# Patient Record
Sex: FEMALE | Race: OTHER | NOT HISPANIC OR LATINO
[De-identification: names, ages, dates, MRNs, and addresses within clinical notes are randomized per-mention and may not be internally consistent; named-entity substitution may affect disease eponyms.]

---

## 2023-07-14 PROBLEM — Z00.00 ENCOUNTER FOR PREVENTIVE HEALTH EXAMINATION: Status: ACTIVE | Noted: 2023-07-14

## 2023-07-18 ENCOUNTER — NON-APPOINTMENT (OUTPATIENT)
Age: 73
End: 2023-07-18

## 2023-07-18 ENCOUNTER — APPOINTMENT (OUTPATIENT)
Dept: ORTHOPEDIC SURGERY | Facility: CLINIC | Age: 73
End: 2023-07-18
Payer: MEDICARE

## 2023-07-18 VITALS
DIASTOLIC BLOOD PRESSURE: 84 MMHG | BODY MASS INDEX: 28.05 KG/M2 | SYSTOLIC BLOOD PRESSURE: 131 MMHG | HEART RATE: 73 BPM | HEIGHT: 57 IN | WEIGHT: 130 LBS

## 2023-07-18 DIAGNOSIS — M19.012 PRIMARY OSTEOARTHRITIS, RIGHT SHOULDER: ICD-10-CM

## 2023-07-18 DIAGNOSIS — M19.011 PRIMARY OSTEOARTHRITIS, RIGHT SHOULDER: ICD-10-CM

## 2023-07-18 PROCEDURE — 99204 OFFICE O/P NEW MOD 45 MIN: CPT

## 2023-07-18 PROCEDURE — 73030 X-RAY EXAM OF SHOULDER: CPT | Mod: 50

## 2023-07-18 NOTE — HISTORY OF PRESENT ILLNESS
[Worsening] : worsening [___ yrs] : [unfilled] year(s) ago [de-identified] : LYDIA HOWELL is a 72 year female being seen for initial visit bilateral shoulder pain R>L. She states she has been feeling pain for 3 years. She notes she has felt she has lost strength and range of motion in her shoulders. She notes she has difficulty holding objects in her hands due to the pain. She has used Motrin for her pain to this point and is no longer effective.

## 2023-07-18 NOTE — REASON FOR VISIT
[Initial Visit] : an initial visit for [Family Member] : family member [FreeTextEntry2] : bilat shoulder pain.

## 2023-07-18 NOTE — PHYSICAL EXAM
[de-identified] : General: Well appearing, no acute distress \par Neurologic: A&Ox3, No focal deficits \par Head: NCAT without abrasions, lacerations, or ecchymosis to head, face, or scalp \par Eyes: No scleral icterus, no gross abnormalities \par Respiratory: Equal chest wall expansion bilaterally, no accessory muscle use \par Lymphatic: No lymphadenopathy palpated \par Skin: Warm and dry \par Psychiatric: Normal mood and affect\par \par Right Shoulder:\par · Inspection/Palpation: no tenderness, swelling or deformities\par · Range of Motion: full and painless in all planes, no crepitus; FF 0-90; ER at side 10; IR to T7\par · Strength: forward elevation in scapular plane [5]/5, internal rotation [5]/5, external rotation [5]/5,\par adduction [5]/5 and abduction [5]/5\par · Stability: load and shift test negative, apprehension test negative, relocation test negative, sulcus\par sign negative, Echo test negative, Jerk test negative\par · Tests: Ba negative, Neer's test's test negative, drop arm test negative, bear hug test negative,\par [place] sign negative, cross arm adduction negative, lift off sign negative, Hornblower's sign negative,\par speeds test negative, Yergason's test negative, bicipital groove tenderness negative, Renee's Active\par Compression test negative, whipple test negative, bicep's load II test negative\par \par Left Shoulder\par -Inspection/Palpation: no tenderness, swelling or deformities. No evidence of muscle atrophy. No\par swelling, erythema, or ecchymosis. Nontender to palpation throughout the Left shoulder including the\par anterior aspect of the bicipital groove, AC joint, trapezius, and posterior shoulder\par -Range of Motion: full and painless in all planes, no crepitus; active FF 0-110; ER at\par side 15; IR to T7\par -Strength: forward elevation in scapular plane [5]/5, internal rotation [5]/5, external rotation [5]/5,\par adduction [5]/5 and abduction [5]/5\par -Stability: load and shift test negative, apprehension test negative, relocation test negative, sulcus sign\par negative, Echo test negative, Jerk test negative\par -Tests: Ba negative, Neer's test negative, drop arm test negative, bear hug test negative, [place]\par test negative, lift off sign negative, Hornblower's sign negative, no sulcus sign present, Speed's test negative, Yergason's test negative, [place]'s Active Compression test negative, Whipple test negative,\par Biceps load II test negative, crossed arm adduction negative\par Components of the arm are soft and nontender without evidence of DVT or compartment syndrome. The\par patient is grossly neurovascularly intact.\par  [de-identified] : The following radiographs were ordered and read by me during this patient's visit. I reviewed each\par radiograph in detail with the patient and discussed the findings as highlighted below.\par 3 views of the right shoulder were obtained today that show no fracture, dislocation. There is no malalignment. There is osteocyte formation at the inferior neck of the humerus and reduced joint space. Osteoarthritis noted. No high riding humerus. Otherwise unremarkable.\par \par 3 views of the left shoulder were obtained today that show no fracture, dislocation. There is no malalignment. There is osteocyte formation at the inferior neck of the humerus and joint space narrowing that is less significant than the right shoulder. Osteoarthritis noted. No high riding humerus. Otherwise unremarkable.\par

## 2023-07-18 NOTE — REVIEW OF SYSTEMS
[Arthralgia] : arthralgia [Joint Pain] : joint pain [Negative] : Heme/Lymph [FreeTextEntry9] : bilat shoulder

## 2023-07-18 NOTE — DISCUSSION/SUMMARY
[de-identified] : We had a thorough discussion regarding the nature of her pain, the pathophysiology, as well as all treatment options. I discussed operative and non-operative treatment modalities. Cortisone and HA injections were discussed with the patient. All risks, benefits and alternatives to the proposed surgical procedure, right shoulder replacement, were discussed in great detail with the patient. Risks include but are not limited to pain, bleeding, infection, stiffness, medical complications (including DVT, PE, MI), and risks of anesthesia. The patient expressed understanding and all questions were answered. The patient is not electing to proceed at this time.

## 2023-07-18 NOTE — ADDENDUM
[FreeTextEntry1] : Documented by Janet Benton acting as a scribe for Dr. Isaacs on 07/18/2023. All medical record entries made by the Scribe were at my, Dr. Isaacs's, direction and personally dictated by me on 07/18/2023. I have reviewed the chart and agree that the record accurately reflects my personal performance of the history, physical exam, procedure and imaging.\par

## 2023-09-06 ENCOUNTER — RX RENEWAL (OUTPATIENT)
Age: 73
End: 2023-09-06

## 2023-09-06 RX ORDER — CELECOXIB 200 MG/1
200 CAPSULE ORAL
Qty: 21 | Refills: 1 | Status: ACTIVE | COMMUNITY
Start: 2023-07-18 | End: 1900-01-01